# Patient Record
Sex: FEMALE | Race: OTHER | Employment: OTHER | URBAN - METROPOLITAN AREA
[De-identification: names, ages, dates, MRNs, and addresses within clinical notes are randomized per-mention and may not be internally consistent; named-entity substitution may affect disease eponyms.]

---

## 2018-11-29 PROBLEM — E78.5 DYSLIPIDEMIA: Status: ACTIVE | Noted: 2018-11-29

## 2018-11-29 PROBLEM — I10 ESSENTIAL HYPERTENSION: Status: ACTIVE | Noted: 2018-11-29

## 2018-11-29 PROBLEM — E03.9 ACQUIRED HYPOTHYROIDISM: Status: ACTIVE | Noted: 2018-11-29

## 2019-08-28 PROCEDURE — 87077 CULTURE AEROBIC IDENTIFY: CPT | Performed by: EMERGENCY MEDICINE

## 2019-08-28 PROCEDURE — 87086 URINE CULTURE/COLONY COUNT: CPT | Performed by: EMERGENCY MEDICINE

## 2019-08-28 PROCEDURE — 87186 SC STD MICRODIL/AGAR DIL: CPT | Performed by: EMERGENCY MEDICINE

## 2019-09-06 PROBLEM — N18.30 STAGE 3 CHRONIC KIDNEY DISEASE (HCC): Status: ACTIVE | Noted: 2019-09-06

## 2019-09-06 PROBLEM — D50.9 IRON DEFICIENCY ANEMIA, UNSPECIFIED IRON DEFICIENCY ANEMIA TYPE: Status: ACTIVE | Noted: 2019-09-06

## 2019-09-06 PROBLEM — R05.9 COUGH: Status: ACTIVE | Noted: 2019-09-06

## 2019-09-12 ENCOUNTER — HOSPITAL ENCOUNTER (OUTPATIENT)
Dept: GENERAL RADIOLOGY | Age: 84
Discharge: HOME | End: 2019-09-12
Attending: FAMILY MEDICINE
Payer: MEDICARE

## 2019-09-12 ENCOUNTER — LAB ENCOUNTER (OUTPATIENT)
Dept: LAB | Age: 84
End: 2019-09-12
Attending: FAMILY MEDICINE
Payer: MEDICARE

## 2019-09-12 DIAGNOSIS — E11.9 TYPE 2 DIABETES MELLITUS WITHOUT COMPLICATION, WITHOUT LONG-TERM CURRENT USE OF INSULIN (HCC): ICD-10-CM

## 2019-09-12 DIAGNOSIS — D64.9 ANEMIA, UNSPECIFIED TYPE: ICD-10-CM

## 2019-09-12 DIAGNOSIS — R05.9 COUGH: ICD-10-CM

## 2019-09-12 LAB
ANION GAP SERPL CALC-SCNC: 11 MMOL/L (ref 0–18)
BASOPHILS # BLD AUTO: 0.05 X10(3) UL (ref 0–0.2)
BASOPHILS NFR BLD AUTO: 0.6 %
BUN BLD-MCNC: 37 MG/DL (ref 7–18)
BUN/CREAT SERPL: 26.6 (ref 10–20)
CALCIUM BLD-MCNC: 9.7 MG/DL (ref 8.5–10.1)
CHLORIDE SERPL-SCNC: 105 MMOL/L (ref 98–112)
CO2 SERPL-SCNC: 24 MMOL/L (ref 21–32)
CREAT BLD-MCNC: 1.39 MG/DL (ref 0.55–1.02)
CREAT UR-SCNC: 220 MG/DL
DEPRECATED RDW RBC AUTO: 48.7 FL (ref 35.1–46.3)
EOSINOPHIL # BLD AUTO: 0.14 X10(3) UL (ref 0–0.7)
EOSINOPHIL NFR BLD AUTO: 1.8 %
ERYTHROCYTE [DISTWIDTH] IN BLOOD BY AUTOMATED COUNT: 13.9 % (ref 11–15)
GLUCOSE BLD-MCNC: 165 MG/DL (ref 70–99)
HCT VFR BLD AUTO: 34.1 % (ref 35–48)
HGB BLD-MCNC: 10.5 G/DL (ref 12–16)
IMM GRANULOCYTES # BLD AUTO: 0.04 X10(3) UL (ref 0–1)
IMM GRANULOCYTES NFR BLD: 0.5 %
LYMPHOCYTES # BLD AUTO: 2.19 X10(3) UL (ref 1–4)
LYMPHOCYTES NFR BLD AUTO: 27.5 %
MCH RBC QN AUTO: 29.9 PG (ref 26–34)
MCHC RBC AUTO-ENTMCNC: 30.8 G/DL (ref 31–37)
MCV RBC AUTO: 97.2 FL (ref 80–100)
MICROALBUMIN UR-MCNC: 1.12 MG/DL
MICROALBUMIN/CREAT 24H UR-RTO: 5.1 UG/MG (ref ?–30)
MONOCYTES # BLD AUTO: 0.58 X10(3) UL (ref 0.1–1)
MONOCYTES NFR BLD AUTO: 7.3 %
NEUTROPHILS # BLD AUTO: 4.96 X10 (3) UL (ref 1.5–7.7)
NEUTROPHILS # BLD AUTO: 4.96 X10(3) UL (ref 1.5–7.7)
NEUTROPHILS NFR BLD AUTO: 62.3 %
OSMOLALITY SERPL CALC.SUM OF ELEC: 302 MOSM/KG (ref 275–295)
PLATELET # BLD AUTO: 550 10(3)UL (ref 150–450)
POTASSIUM SERPL-SCNC: 4.3 MMOL/L (ref 3.5–5.1)
RBC # BLD AUTO: 3.51 X10(6)UL (ref 3.8–5.3)
SODIUM SERPL-SCNC: 140 MMOL/L (ref 136–145)
WBC # BLD AUTO: 8 X10(3) UL (ref 4–11)

## 2019-09-12 PROCEDURE — 80048 BASIC METABOLIC PNL TOTAL CA: CPT

## 2019-09-12 PROCEDURE — 36415 COLL VENOUS BLD VENIPUNCTURE: CPT

## 2019-09-12 PROCEDURE — 82570 ASSAY OF URINE CREATININE: CPT

## 2019-09-12 PROCEDURE — 71046 X-RAY EXAM CHEST 2 VIEWS: CPT | Performed by: FAMILY MEDICINE

## 2019-09-12 PROCEDURE — 85025 COMPLETE CBC W/AUTO DIFF WBC: CPT

## 2019-09-12 PROCEDURE — 82043 UR ALBUMIN QUANTITATIVE: CPT

## 2019-09-17 NOTE — PROGRESS NOTES
Patient informed per my chart. Unsure if she looks at my chart. Cbc ordered to be done in two months. Please follow up with this.  Results for orders placed or performed in visit on 09/12/19  -MICROALB/CREAT RATIO, RANDOM URINE  Collection Time: 09/12/1 9.7                           8.5 - 10.1 mg/dL                Calculated Osmolality                             302 (H)                       275 - 295 mOsm/kg               GFR, Non-                         33 (L) 2.19                          1.00 - 4.00 x10(3) uL           Monocyte Absolute                                 0.58                          0.10 - 1.00 x10(3) uL           Eosinophil Absolute                               0.14

## 2020-10-12 PROBLEM — I12.9 TYPE 2 DM WITH CKD STAGE 3 AND HYPERTENSION (HCC): Status: ACTIVE | Noted: 2020-10-12

## 2020-10-12 PROBLEM — N18.30 TYPE 2 DM WITH CKD STAGE 3 AND HYPERTENSION (HCC): Status: ACTIVE | Noted: 2020-10-12

## 2020-10-12 PROBLEM — E11.22 TYPE 2 DM WITH CKD STAGE 3 AND HYPERTENSION (HCC): Status: ACTIVE | Noted: 2020-10-12

## 2022-03-10 PROBLEM — E11.69 DYSLIPIDEMIA ASSOCIATED WITH TYPE 2 DIABETES MELLITUS (HCC): Status: ACTIVE | Noted: 2022-03-10

## 2022-03-10 PROBLEM — E11.22 TYPE 2 DIABETES MELLITUS WITH STAGE 4 CHRONIC KIDNEY DISEASE, WITH LONG-TERM CURRENT USE OF INSULIN (HCC): Status: ACTIVE | Noted: 2020-10-12

## 2022-03-10 PROBLEM — E78.5 DYSLIPIDEMIA ASSOCIATED WITH TYPE 2 DIABETES MELLITUS (HCC): Status: ACTIVE | Noted: 2022-03-10

## 2022-03-10 PROBLEM — N18.4 TYPE 2 DIABETES MELLITUS WITH STAGE 4 CHRONIC KIDNEY DISEASE, WITH LONG-TERM CURRENT USE OF INSULIN (HCC): Status: ACTIVE | Noted: 2020-10-12

## 2022-03-10 PROBLEM — Z79.4 TYPE 2 DIABETES MELLITUS WITH STAGE 4 CHRONIC KIDNEY DISEASE, WITH LONG-TERM CURRENT USE OF INSULIN (HCC): Status: ACTIVE | Noted: 2020-10-12

## 2025-03-21 ENCOUNTER — HOSPITAL ENCOUNTER (INPATIENT)
Facility: HOSPITAL | Age: OVER 89
LOS: 3 days | Discharge: HOME HEALTH | End: 2025-03-24
Attending: EMERGENCY MEDICINE | Admitting: HOSPITALIST
Payer: MEDICARE

## 2025-03-21 ENCOUNTER — HOSPITAL ENCOUNTER (INPATIENT)
Facility: HOSPITAL | Age: OVER 89
LOS: 3 days | Discharge: HOME | End: 2025-03-24
Attending: EMERGENCY MEDICINE | Admitting: HOSPITALIST
Payer: MEDICARE

## 2025-03-21 ENCOUNTER — APPOINTMENT (OUTPATIENT)
Dept: GENERAL RADIOLOGY | Facility: HOSPITAL | Age: OVER 89
End: 2025-03-21
Attending: EMERGENCY MEDICINE
Payer: MEDICARE

## 2025-03-21 DIAGNOSIS — J96.01 ACUTE HYPOXEMIC RESPIRATORY FAILURE (HCC): ICD-10-CM

## 2025-03-21 DIAGNOSIS — J45.901 ASTHMA WITH ACUTE EXACERBATION, UNSPECIFIED ASTHMA SEVERITY, UNSPECIFIED WHETHER PERSISTENT (HCC): ICD-10-CM

## 2025-03-21 DIAGNOSIS — J18.9 COMMUNITY ACQUIRED PNEUMONIA, UNSPECIFIED LATERALITY: Primary | ICD-10-CM

## 2025-03-21 LAB
ADENOVIRUS PCR:: NOT DETECTED
ALBUMIN SERPL-MCNC: 4.2 G/DL (ref 3.2–4.8)
ALBUMIN/GLOB SERPL: 1.1 {RATIO} (ref 1–2)
ALP LIVER SERPL-CCNC: 104 U/L
ALT SERPL-CCNC: <7 U/L
ANION GAP SERPL CALC-SCNC: 20 MMOL/L (ref 0–18)
AST SERPL-CCNC: 14 U/L (ref ?–34)
ATRIAL RATE: 101 BPM
B PARAPERT DNA SPEC QL NAA+PROBE: NOT DETECTED
B PERT DNA SPEC QL NAA+PROBE: NOT DETECTED
BASE EXCESS BLDV CALC-SCNC: 4.3 MMOL/L
BASOPHILS # BLD AUTO: 0.06 X10(3) UL (ref 0–0.2)
BASOPHILS NFR BLD AUTO: 0.4 %
BILIRUB SERPL-MCNC: 0.6 MG/DL (ref 0.2–0.9)
BUN BLD-MCNC: 34 MG/DL (ref 9–23)
C PNEUM DNA SPEC QL NAA+PROBE: NOT DETECTED
CA-I BLD-SCNC: 1.14 MMOL/L (ref 0.95–1.32)
CALCIUM BLD-MCNC: 9.9 MG/DL (ref 8.7–10.6)
CHLORIDE SERPL-SCNC: 96 MMOL/L (ref 98–112)
CO2 SERPL-SCNC: 20 MMOL/L (ref 21–32)
CORONAVIRUS 229E PCR:: NOT DETECTED
CORONAVIRUS HKU1 PCR:: NOT DETECTED
CORONAVIRUS NL63 PCR:: NOT DETECTED
CORONAVIRUS OC43 PCR:: NOT DETECTED
CREAT BLD-MCNC: 1.48 MG/DL
EGFRCR SERPLBLD CKD-EPI 2021: 32 ML/MIN/1.73M2 (ref 60–?)
EOSINOPHIL # BLD AUTO: 0.12 X10(3) UL (ref 0–0.7)
EOSINOPHIL NFR BLD AUTO: 0.8 %
ERYTHROCYTE [DISTWIDTH] IN BLOOD BY AUTOMATED COUNT: 13.6 %
EST. AVERAGE GLUCOSE BLD GHB EST-MCNC: 258 MG/DL (ref 68–126)
FLUAV + FLUBV RNA SPEC NAA+PROBE: NEGATIVE
FLUAV + FLUBV RNA SPEC NAA+PROBE: NEGATIVE
FLUAV RNA SPEC QL NAA+PROBE: NOT DETECTED
FLUBV RNA SPEC QL NAA+PROBE: NOT DETECTED
GLOBULIN PLAS-MCNC: 3.7 G/DL (ref 2–3.5)
GLUCOSE BLD-MCNC: 280 MG/DL (ref 70–99)
GLUCOSE BLD-MCNC: 335 MG/DL (ref 70–99)
GLUCOSE BLD-MCNC: 413 MG/DL (ref 70–99)
GLUCOSE BLD-MCNC: 503 MG/DL (ref 70–99)
GLUCOSE BLD-MCNC: 521 MG/DL (ref 70–99)
HBA1C MFR BLD: 10.6 % (ref ?–5.7)
HCO3 BLDV-SCNC: 28.2 MEQ/L (ref 22–26)
HCT VFR BLD AUTO: 34.8 %
HGB BLD-MCNC: 11.7 G/DL
IMM GRANULOCYTES # BLD AUTO: 0.2 X10(3) UL (ref 0–1)
IMM GRANULOCYTES NFR BLD: 1.4 %
L PNEUMO AG UR QL: NEGATIVE
LACTATE SERPL-SCNC: 1 MMOL/L (ref 0.5–2)
LYMPHOCYTES # BLD AUTO: 2.13 X10(3) UL (ref 1–4)
LYMPHOCYTES NFR BLD AUTO: 14.8 %
MCH RBC QN AUTO: 29.7 PG (ref 26–34)
MCHC RBC AUTO-ENTMCNC: 33.6 G/DL (ref 31–37)
MCV RBC AUTO: 88.3 FL
METAPNEUMOVIRUS PCR:: NOT DETECTED
MONOCYTES # BLD AUTO: 1.09 X10(3) UL (ref 0.1–1)
MONOCYTES NFR BLD AUTO: 7.6 %
MYCOPLASMA PNEUMONIA PCR:: NOT DETECTED
NEUTROPHILS # BLD AUTO: 10.8 X10 (3) UL (ref 1.5–7.7)
NEUTROPHILS # BLD AUTO: 10.8 X10(3) UL (ref 1.5–7.7)
NEUTROPHILS NFR BLD AUTO: 75 %
OSMOLALITY SERPL CALC.SUM OF ELEC: 300 MOSM/KG (ref 275–295)
OXYHGB MFR BLDV: 95.4 % (ref 72–78)
P AXIS: 79 DEGREES
P-R INTERVAL: 142 MS
PARAINFLUENZA 1 PCR:: NOT DETECTED
PARAINFLUENZA 2 PCR:: NOT DETECTED
PARAINFLUENZA 3 PCR:: NOT DETECTED
PARAINFLUENZA 4 PCR:: NOT DETECTED
PCO2 BLDV: 34 MM HG (ref 38–50)
PH BLDV: 7.51 [PH] (ref 7.33–7.43)
PLATELET # BLD AUTO: 452 10(3)UL (ref 150–450)
PO2 BLDV: 104 MM HG (ref 30–50)
POTASSIUM BLD-SCNC: 4.2 MMOL/L (ref 3.6–5.1)
POTASSIUM SERPL-SCNC: 4.1 MMOL/L (ref 3.5–5.1)
PROCALCITONIN SERPL-MCNC: 0.21 NG/ML (ref ?–0.05)
PROT SERPL-MCNC: 7.9 G/DL (ref 5.7–8.2)
Q-T INTERVAL: 382 MS
QRS DURATION: 120 MS
QTC CALCULATION (BEZET): 495 MS
R AXIS: -56 DEGREES
RBC # BLD AUTO: 3.94 X10(6)UL
RHINOVIRUS/ENTERO PCR:: NOT DETECTED
RSV RNA SPEC NAA+PROBE: NEGATIVE
RSV RNA SPEC QL NAA+PROBE: NOT DETECTED
SARS-COV-2 RNA NPH QL NAA+NON-PROBE: NOT DETECTED
SARS-COV-2 RNA RESP QL NAA+PROBE: NOT DETECTED
SODIUM BLD-SCNC: 132 MMOL/L (ref 135–145)
SODIUM SERPL-SCNC: 136 MMOL/L (ref 136–145)
T AXIS: 90 DEGREES
VENOUS LITERS PER MINUTE: 10 L/MIN
VENTRICULAR RATE: 101 BPM
WBC # BLD AUTO: 14.4 X10(3) UL (ref 4–11)

## 2025-03-21 PROCEDURE — 71045 X-RAY EXAM CHEST 1 VIEW: CPT | Performed by: EMERGENCY MEDICINE

## 2025-03-21 PROCEDURE — 99223 1ST HOSP IP/OBS HIGH 75: CPT | Performed by: INTERNAL MEDICINE

## 2025-03-21 RX ORDER — ONDANSETRON 2 MG/ML
4 INJECTION INTRAMUSCULAR; INTRAVENOUS EVERY 6 HOURS PRN
Status: DISCONTINUED | OUTPATIENT
Start: 2025-03-21 | End: 2025-03-24

## 2025-03-21 RX ORDER — METHYLPREDNISOLONE SODIUM SUCCINATE 125 MG/2ML
125 INJECTION INTRAMUSCULAR; INTRAVENOUS ONCE
Status: COMPLETED | OUTPATIENT
Start: 2025-03-21 | End: 2025-03-21

## 2025-03-21 RX ORDER — NICOTINE POLACRILEX 4 MG
30 LOZENGE BUCCAL
Status: DISCONTINUED | OUTPATIENT
Start: 2025-03-21 | End: 2025-03-24

## 2025-03-21 RX ORDER — IPRATROPIUM BROMIDE AND ALBUTEROL SULFATE 2.5; .5 MG/3ML; MG/3ML
3 SOLUTION RESPIRATORY (INHALATION)
Status: DISCONTINUED | OUTPATIENT
Start: 2025-03-21 | End: 2025-03-24

## 2025-03-21 RX ORDER — ALBUTEROL SULFATE 0.83 MG/ML
2.5 SOLUTION RESPIRATORY (INHALATION) EVERY 4 HOURS PRN
Status: DISCONTINUED | OUTPATIENT
Start: 2025-03-21 | End: 2025-03-22

## 2025-03-21 RX ORDER — INSULIN DEGLUDEC 100 U/ML
10 INJECTION, SOLUTION SUBCUTANEOUS NIGHTLY
Status: DISCONTINUED | OUTPATIENT
Start: 2025-03-21 | End: 2025-03-21

## 2025-03-21 RX ORDER — LOSARTAN POTASSIUM 25 MG/1
TABLET ORAL DAILY
COMMUNITY
End: 2025-03-21 | Stop reason: CLARIF

## 2025-03-21 RX ORDER — AZITHROMYCIN 250 MG/1
500 TABLET, FILM COATED ORAL DAILY
Status: COMPLETED | OUTPATIENT
Start: 2025-03-22 | End: 2025-03-23

## 2025-03-21 RX ORDER — INSULIN DEGLUDEC 100 U/ML
15 INJECTION, SOLUTION SUBCUTANEOUS NIGHTLY
Status: DISCONTINUED | OUTPATIENT
Start: 2025-03-21 | End: 2025-03-23

## 2025-03-21 RX ORDER — SODIUM CHLORIDE 9 MG/ML
INJECTION, SOLUTION INTRAVENOUS CONTINUOUS
Status: DISCONTINUED | OUTPATIENT
Start: 2025-03-21 | End: 2025-03-22

## 2025-03-21 RX ORDER — ENOXAPARIN SODIUM 100 MG/ML
30 INJECTION SUBCUTANEOUS NIGHTLY
Status: DISCONTINUED | OUTPATIENT
Start: 2025-03-21 | End: 2025-03-24

## 2025-03-21 RX ORDER — NICOTINE POLACRILEX 4 MG
15 LOZENGE BUCCAL
Status: DISCONTINUED | OUTPATIENT
Start: 2025-03-21 | End: 2025-03-24

## 2025-03-21 RX ORDER — ACETAMINOPHEN 500 MG
500 TABLET ORAL EVERY 4 HOURS PRN
Status: DISCONTINUED | OUTPATIENT
Start: 2025-03-21 | End: 2025-03-24

## 2025-03-21 RX ORDER — SODIUM CHLORIDE 9 MG/ML
INJECTION, SOLUTION INTRAVENOUS CONTINUOUS
Status: DISCONTINUED | OUTPATIENT
Start: 2025-03-21 | End: 2025-03-23

## 2025-03-21 RX ORDER — GLIPIZIDE 2.5 MG/1
2.5 TABLET, EXTENDED RELEASE ORAL 2 TIMES DAILY
COMMUNITY
End: 2025-03-24

## 2025-03-21 RX ORDER — MONTELUKAST SODIUM 10 MG/1
10 TABLET ORAL NIGHTLY
Status: DISCONTINUED | OUTPATIENT
Start: 2025-03-21 | End: 2025-03-24

## 2025-03-21 RX ORDER — LEVOTHYROXINE SODIUM 50 UG/1
50 TABLET ORAL
Status: DISCONTINUED | OUTPATIENT
Start: 2025-03-22 | End: 2025-03-24

## 2025-03-21 RX ORDER — ALBUTEROL SULFATE 5 MG/ML
10 SOLUTION RESPIRATORY (INHALATION) ONCE
Status: COMPLETED | OUTPATIENT
Start: 2025-03-21 | End: 2025-03-21

## 2025-03-21 RX ORDER — ECHINACEA PURPUREA EXTRACT 125 MG
1 TABLET ORAL
Status: DISCONTINUED | OUTPATIENT
Start: 2025-03-21 | End: 2025-03-24

## 2025-03-21 RX ORDER — ATORVASTATIN CALCIUM 10 MG/1
10 TABLET, FILM COATED ORAL NIGHTLY
Status: DISCONTINUED | OUTPATIENT
Start: 2025-03-21 | End: 2025-03-24

## 2025-03-21 RX ORDER — LOSARTAN POTASSIUM AND HYDROCHLOROTHIAZIDE 12.5; 5 MG/1; MG/1
1 TABLET ORAL DAILY
COMMUNITY

## 2025-03-21 RX ORDER — DEXTROSE MONOHYDRATE 25 G/50ML
50 INJECTION, SOLUTION INTRAVENOUS
Status: DISCONTINUED | OUTPATIENT
Start: 2025-03-21 | End: 2025-03-24

## 2025-03-21 RX ORDER — BENZONATATE 200 MG/1
200 CAPSULE ORAL 3 TIMES DAILY PRN
Status: DISCONTINUED | OUTPATIENT
Start: 2025-03-21 | End: 2025-03-24

## 2025-03-21 NOTE — PROGRESS NOTES
NURSING ADMISSION NOTE      Patient admitted via Cart  Oriented to room.  Safety precautions initiated.  Bed in low position.  Call light in reach.     Patient received a/o x4. 88% on room air. Reports SOB. Placed on 2L NC. Denies pain. VSS. Son at bedside. Safety precautions in place. No further needs at this time.

## 2025-03-21 NOTE — H&P
[facility]     China Hawk Patient Status:  Inpatient    1928 MRN [id number]   Location [facility] 5NW-A Attending Patricia Guzman MD   Hosp Day # 0 PCP Gerhard Rashid DO     Chief Complaint: cough     Subjective:    History of Present Illness:     China Hawk is a 89 year old female with asthma, diabetes, hyper lipidemia, hypothyroidism to the emergency room with cough, breath.  This has been ongoing over the last 1 week.  No fever or chills, nausea or vomiting, diarrhea reported.  Lower extremity edema or orthopnea or PND reported.  Emergency room patient was found to be 87% on room air.    History/Other:    Past Medical History:  Past Medical History:    Asthma (HCC)    Disorder of thyroid    Extrinsic asthma, unspecified    Hearing impaired person, bilateral    High cholesterol    Hypothyroid    Other and unspecified hyperlipidemia    Type II or unspecified type diabetes mellitus without mention of complication, not stated as uncontrolled    tingling in feet    Unspecified essential hypertension    Visual impairment     Past Surgical History:   Past Surgical History:   Procedure Laterality Date    Cholecystectomy        Family History:   Family History   Problem Relation Age of Onset    Other (Other) Father          pneumonia    Diabetes Brother      Social History:    reports that she has quit smoking. Her smoking use included cigarettes. She has never used smokeless tobacco. She reports that she does not drink alcohol and does not use drugs.     Allergies: Allergies[1]    Medications:  Medications Ordered Prior to Encounter[2]    Review of Systems:   A comprehensive review of systems was completed.    Pertinent positives and negatives noted in the HPI.    Objective:   Physical Exam:    /67 (BP Location: Left arm)   Pulse 106   Temp 98 °F (36.7 °C) (Axillary)   Resp 18   Ht 5' 2\" (1.575 m)   Wt 120 lb (54.4 kg)   SpO2 92%   BMI 21.95 kg/m²    General: No acute distress, Alert  Respiratory: No rhonchi, no wheezes  Cardiovascular: S1, S2. Regular rate and rhythm  Abdomen: Soft, Non-tender, non-distended, positive bowel sounds  Neuro: No new focal deficits  Extremities: No edema      Results:    Labs:      Labs Last 24 Hours:    Recent Labs   Lab 03/21/25  1058   RBC 3.94   HGB 11.7*   HCT 34.8*   MCV 88.3   MCH 29.7   MCHC 33.6   RDW 13.6   NEPRELIM 10.80*   WBC 14.4*   .0*       Recent Labs   Lab 03/21/25  1058   *   BUN 34*   CREATSERUM 1.48*   EGFRCR 32*   CA 9.9   ALB 4.2      K 4.1   CL 96*   CO2 20.0*   ALKPHO 104   AST 14   ALT <7*   BILT 0.6   TP 7.9       Estimated Glomerular Filtration Rate: 32 mL/min/1.73m2 (A) (result from lab).    No results found for: \"PT\", \"INR\"    No results for input(s): \"TROP\", \"TROPHS\", \"CK\" in the last 168 hours.    No results for input(s): \"TROP\", \"PBNP\" in the last 168 hours.    No results for input(s): \"PCT\" in the last 168 hours.    Imaging: Imaging data reviewed in Epic.    Assessment & Plan:      # Acute hypoxic respiratory failure due to possible underlying pneumonia  # Possible underlying bacterial or viral pneumonia   # Early sepsis (this is, tachycardia and tachypnea, hypoxia)   - blood cx   - sputum cx  - RVP  - PCT  - abx  - neb   - hold off on further steroid as per exam not concerning for asthma exacerbation     # DM 2 with hyperglycemia   - ISS  - Tresiba  - HgA1c    # Asthma   - neb     # mild thrombocytosis likely reactive  # mild metabolic acidosis, monitor   # CKD 4 due to underlying DM  # hypothyroidism       Plan of care discussed with patient and ER.     Romelia Fields MD    Supplementary Documentation:     The 21st Century Cures Act makes medical notes like these available to patients in the interest of transparency. Please be advised this is a medical document. Medical documents are intended to carry relevant information, facts as evident, and the clinical opinion of the  practitioner. The medical note is intended as peer to peer communication and may appear blunt or direct. It is written in medical language and may contain abbreviations or verbiage that are unfamiliar.               **Certification      PHYSICIAN Certification of Need for Inpatient Hospitalization - Initial Certification    Patient will require inpatient services that will reasonably be expected to span two midnight's based on the clinical documentation in H+P.   Based on patients current state of illness, I anticipate that, after discharge, patient will require TBD.                        [1] No Known Allergies  [2]   No current facility-administered medications on file prior to encounter.     Current Outpatient Medications on File Prior to Encounter   Medication Sig Dispense Refill    glipiZIDE ER 2.5 MG Oral Tablet 24 Hr Take 1 tablet (2.5 mg total) by mouth 2 (two) times daily.      losartan-hydroCHLOROthiazide 50-12.5 MG Oral Tab Take 1 tablet by mouth daily.      insulin glargine 100 UNIT/ML Subcutaneous Solution Pen-injector Inject 10 Units into the skin nightly. (Patient taking differently: Inject 10 Units into the skin every morning.) 3 each 3    albuterol (PROAIR HFA) 108 (90 Base) MCG/ACT Inhalation Aero Soln Inhale 1 puff into the lungs every 6 (six) hours as needed for Wheezing. 3 each 3    ALBUTEROL (2.5 MG/3ML) 0.083% Inhalation Nebu Soln INHALE 1 VIAL (3ML) ONE EVERY 6 HOURS AS NEEDED FOR WHEEZING 75 mL 5    montelukast 10 MG Oral Tab Take 1 tablet (10 mg total) by mouth every evening. 90 tablet 3    simvastatin 20 MG Oral Tab Take 1 tablet (20 mg total) by mouth every evening. 90 tablet 3    levothyroxine 50 MCG Oral Tab Take 1 tablet (50 mcg total) by mouth daily. 90 tablet 3

## 2025-03-21 NOTE — ED INITIAL ASSESSMENT (HPI)
Patient to ED, here with family.  Reports for the past month the patient has been having difficulty breathing and a cough.  Negative for coivd 1 week ago.    Increased weakness over the past month as well.

## 2025-03-21 NOTE — ED PROVIDER NOTES
Patient Seen in: [facility] Emergency Department      History     Chief Complaint   Patient presents with    Weakness    Difficulty Breathing     Stated Complaint: weakness, adelita for 1 month    Subjective:   HPI      89-year-old female with history of asthma, diabetes mellitus, hypertension, high cholesterol presents emergency room for evaluation of cough and shortness of breath.  Symptoms have been present for over a week.  Cough is minimally productive.  Patient denies chest pain does feel short of breath.  Denies abdominal pain.  Denies nausea vomiting or diarrhea.  Denies lower extremity swelling or calf pain denies PND orthopnea.  Patient was noted to be hypoxic upon arrival at 87% on room air.    Objective:     Past Medical History:    Extrinsic asthma, unspecified    Hypothyroid    Other and unspecified hyperlipidemia    Type II or unspecified type diabetes mellitus without mention of complication, not stated as uncontrolled    tingling in feet    Unspecified essential hypertension              Past Surgical History:   Procedure Laterality Date    Cholecystectomy                  Social History     Socioeconomic History    Marital status:    Tobacco Use    Smoking status: Former     Types: Cigarettes    Smokeless tobacco: Never    Tobacco comments:     quit about 40 years ago   Substance and Sexual Activity    Alcohol use: No     Alcohol/week: 0.0 standard drinks of alcohol    Drug use: No     Social Drivers of Health      Received from [facility]                  Physical Exam     ED Triage Vitals [03/21/25 1039]   /76   Pulse 103   Resp 24   Temp 97.7 °F (36.5 °C)   Temp src Temporal   SpO2 (!) 87 %   O2 Device None (Room air)       Current Vitals:   Vital Signs  BP: (!) 121/105  Pulse: 94  Resp: 22  Temp: 97.7 °F (36.5 °C)  Temp src: Temporal  MAP (mmHg): (!) 112    Oxygen Therapy  SpO2: 100 %  O2 Device: Other (Comment) (hour long neb treatment)        Physical  Exam  GENERAL: Patient is awake, alert  HEENT: no scleral icterus.  Mucous membranes are moist  HEART: Regular rate and rhythm, no murmurs.  LUNGS: Decreased breath sounds with scattered rhonchi diffuse wheezing  ABDOMEN: Soft, nondistended,non tender  EXTREMITIES: No peripheral edema, no calf tenderness      ED Course     Labs Reviewed   COMP METABOLIC PANEL (14) - Abnormal; Notable for the following components:       Result Value    Glucose 280 (*)     Chloride 96 (*)     CO2 20.0 (*)     Anion Gap 20 (*)     BUN 34 (*)     Creatinine 1.48 (*)     Calculated Osmolality 300 (*)     eGFR-Cr 32 (*)     ALT <7 (*)     Globulin  3.7 (*)     All other components within normal limits   CBC WITH DIFFERENTIAL WITH PLATELET - Abnormal; Notable for the following components:    WBC 14.4 (*)     HGB 11.7 (*)     HCT 34.8 (*)     .0 (*)     Neutrophil Absolute Prelim 10.80 (*)     Neutrophil Absolute 10.80 (*)     Monocyte Absolute 1.09 (*)     All other components within normal limits   VBG PANEL WITH ELECTROLYTES - Abnormal; Notable for the following components:    Venous pH 7.51 (*)     Venous pCO2 34 (*)     Venous pO2 104 (*)     Venous HCO3 28.2 (*)     Venous O2Hb 95.4 (*)     Sodium Blood Gas 132 (*)     All other components within normal limits   SARS-COV-2/FLU A AND B/RSV BY PCR (Fixstars[employer]ERT) - Normal    Narrative:     This test is intended for the qualitative detection and differentiation of SARS-CoV-2, influenza A, influenza B, and respiratory syncytial virus (RSV) viral RNA in nasopharyngeal or nares swabs from individuals suspected of respiratory viral infection consistent with COVID-19 by their healthcare provider. Signs and symptoms of respiratory viral infection due to SARS-CoV-2, influenza, and RSV can be similar.    Test performed using the Xpert Xpress SARS-CoV-2/FLU/RSV (real time RT-PCR)  assay on the [employer] instrument, [employer], [place] [address].   This test is being used under the Food and Drug  Administration's Emergency Use Authorization.    The authorized Fact Sheet for Healthcare Providers for this assay is available upon request from the laboratory.   LACTIC ACID, PLASMA   VENOUS BLOOD GAS   RAINBOW DRAW LAVENDER   RAINBOW DRAW LIGHT GREEN   RAINBOW DRAW BLUE   BLOOD CULTURE   BLOOD CULTURE   BLOOD CULTURE     EKG    Rate, intervals and axes as noted on EKG Report.  Rate: 101  Rhythm: Sinus Rhythm  Reading: Sinus tachycardia.  No ST elevation.                A total of 40 minutes of critical care time (exclusive of billable procedures) was administered to manage the patient's respiratory instability due to her acute hypoxemic respiratory failure/asthma exacerbation/pneumonia.  This involved direct patient intervention, complex decision making, and/or extensive discussions with the patient, family, and clinical staff.         MDM        Differential diagnosis before testing includes but not limited to pneumonia, bronchitis, pneumothorax, pulm edema, COVID, RSV, influenza, which is a medical condition that poses a threat to life/function    Past Medical History/comorbidities-as noted in HPI    Radiographic images  I personally reviewed the radiographs and my individual interpretation shows right lower lobe infiltrate  I also reviewed the official reports that showed findings suspicious for right lower lobe pneumonia with suspected mild subpulmonic effusion    Discussion of management (consult/physicians, social work, pharmacy,ect) Dr. Fields, hospitalist physician      Course of Events during Emergency Room Visit include IV established placed on cardiac monitor and pulse ox.  Hour-long nebulizer and IV steroids given.  Chest x-ray concerning for pneumonia, blood cultures performed.  Chemistry sodium 136 potassium 4.1 bicarb 20 BUN 34 creatinine 1.4 glucose 280.  White count 14.4 hemoglobin 9.7 platelet 452.  COVID/RSV/influenza was negative.  On reevaluation there is marked improvement air movement  bilaterally, patient still has mild expiratory wheeze.  Discussed with hospitalist physician.  Will admit for further evaluation and treatment.  Discussed all results and plan the patient and family at bedside they agree with plan    Shared decision making was utilized           Disposition:    Admission  I have discussed with the patient the results of test, differential diagnosis, and treatment plan. They expressed clear understanding of these instructions and agrees to the plan provided.       Note to patient: The 21st Century Cures Act makes medical notes like these available to patients in the interest of transparency. However, this is a medical document intended as peer to peer communication. It is written in medical language and may contain abbreviations or verbiage that are unfamiliar. It may appear blunt or direct. Medical documents are intended to carry relevant information, facts as evident, and the clinical opinion of the practitioner.             Admission disposition: 3/21/2025 11:45 AM           Medical Decision Making      Disposition and Plan     Clinical Impression:  1. Community acquired pneumonia, unspecified laterality    2. Acute hypoxemic respiratory failure (HCC)    3. Asthma with acute exacerbation, unspecified asthma severity, unspecified whether persistent (HCC)         Disposition:  Admit  3/21/2025 11:45 am    Follow-up:  No follow-up provider specified.        Medications Prescribed:  Current Discharge Medication List              Supplementary Documentation:         Hospital Problems       Present on Admission  Date Reviewed: 3/10/2022            ICD-10-CM Noted POA    * (Principal) Community acquired pneumonia, unspecified laterality J18.9 3/21/2025 Unknown

## 2025-03-21 NOTE — ED QUICK NOTES
Orders for admission, patient is aware of plan and ready to go upstairs. Any questions, please call ED RN jignesh at extension [phone].     Patient Covid vaccination status: Partially vaccinated     COVID Test Ordered in ED: SARS-CoV-2/Flu A and B/RSV by PCR (GeneXpert)    COVID Suspicion at Admission: N/A    Running Infusions:    sodium chloride      Rocephin     Mental Status/LOC at time of transport: A&O x 3    Other pertinent information:   CIWA score: N/A   NIH score:  N/A

## 2025-03-22 PROBLEM — Z79.4 TYPE 2 DIABETES MELLITUS WITH HYPERGLYCEMIA, WITH LONG-TERM CURRENT USE OF INSULIN (HCC): Status: ACTIVE | Noted: 2025-03-22

## 2025-03-22 PROBLEM — E11.65 TYPE 2 DIABETES MELLITUS WITH HYPERGLYCEMIA, WITH LONG-TERM CURRENT USE OF INSULIN (HCC): Status: ACTIVE | Noted: 2025-03-22

## 2025-03-22 LAB
ANION GAP SERPL CALC-SCNC: 12 MMOL/L (ref 0–18)
BUN BLD-MCNC: 32 MG/DL (ref 9–23)
CALCIUM BLD-MCNC: 9.3 MG/DL (ref 8.7–10.6)
CHLORIDE SERPL-SCNC: 105 MMOL/L (ref 98–112)
CO2 SERPL-SCNC: 22 MMOL/L (ref 21–32)
CREAT BLD-MCNC: 1.34 MG/DL
EGFRCR SERPLBLD CKD-EPI 2021: 36 ML/MIN/1.73M2 (ref 60–?)
ERYTHROCYTE [DISTWIDTH] IN BLOOD BY AUTOMATED COUNT: 13.9 %
GLUCOSE BLD-MCNC: 115 MG/DL (ref 70–99)
GLUCOSE BLD-MCNC: 217 MG/DL (ref 70–99)
GLUCOSE BLD-MCNC: 270 MG/DL (ref 70–99)
GLUCOSE BLD-MCNC: 275 MG/DL (ref 70–99)
GLUCOSE BLD-MCNC: 344 MG/DL (ref 70–99)
HCT VFR BLD AUTO: 30.5 %
HGB BLD-MCNC: 9.9 G/DL
MCH RBC QN AUTO: 29 PG (ref 26–34)
MCHC RBC AUTO-ENTMCNC: 32.5 G/DL (ref 31–37)
MCV RBC AUTO: 89.4 FL
OSMOLALITY SERPL CALC.SUM OF ELEC: 301 MOSM/KG (ref 275–295)
PLATELET # BLD AUTO: 404 10(3)UL (ref 150–450)
POTASSIUM SERPL-SCNC: 3.7 MMOL/L (ref 3.5–5.1)
RBC # BLD AUTO: 3.41 X10(6)UL
SODIUM SERPL-SCNC: 139 MMOL/L (ref 136–145)
WBC # BLD AUTO: 14.8 X10(3) UL (ref 4–11)

## 2025-03-22 PROCEDURE — 99232 SBSQ HOSP IP/OBS MODERATE 35: CPT | Performed by: INTERNAL MEDICINE

## 2025-03-22 RX ORDER — FUROSEMIDE 10 MG/ML
20 INJECTION INTRAMUSCULAR; INTRAVENOUS ONCE
Status: COMPLETED | OUTPATIENT
Start: 2025-03-22 | End: 2025-03-22

## 2025-03-22 RX ORDER — ALBUTEROL SULFATE 0.83 MG/ML
2.5 SOLUTION RESPIRATORY (INHALATION)
Status: DISCONTINUED | OUTPATIENT
Start: 2025-03-22 | End: 2025-03-22

## 2025-03-22 RX ORDER — SODIUM CHLORIDE FOR INHALATION 3 %
3 VIAL, NEBULIZER (ML) INHALATION
Status: COMPLETED | OUTPATIENT
Start: 2025-03-22 | End: 2025-03-22

## 2025-03-22 NOTE — PROGRESS NOTES
Problem: PNA    Data: Pt is A&Ox3. Forgetful at times.  on 1L. RAB.  On tele. NSR/S. Gets lovenox. Tolerating diet. Voids up SBA and R.W.. BG elevated tonight MD aware see orders given for insulin.     Intervention: IVF, Additional Insulin given for High BG. IV abx's, PO abx's     Edu:  Bed alarm on and call light within reach. VSS so far.

## 2025-03-22 NOTE — PROGRESS NOTES
[facility]   [facility]     Hospitalist Progress Note     China Hawk Patient Status:  Inpatient    1928 MRN [id number]   Location [facility] 5NW-A Attending Romelia Fields MD   Hosp Day # 1 PCP Gerhard Rashid DO     Chief Complaint: cough     Subjective:     Patient coughing a lot. On 2 L NC. Son at bedside.     Objective:    Review of Systems:   A comprehensive review of systems was completed; pertinent positive and negatives stated in subjective.    Vital signs:  Temp:  [97.9 °F (36.6 °C)-98.9 °F (37.2 °C)] 97.9 °F (36.6 °C)  Pulse:  [] 103  Resp:  [20-25] 25  BP: (115-151)/(50-92) 151/66  SpO2:  [91 %-99 %] 91 %    Physical Exam:    General: No acute distress  Respiratory: coarse bibasilar BS  Cardiovascular: S1, S2, regular rate and rhythm  Abdomen: Soft, Non-tender, non-distended, positive bowel sounds  Neuro: No new focal deficits.   Extremities: No edema      Diagnostic Data:    Labs:  Recent Labs   Lab 25  1058 25  0707   WBC 14.4* 14.8*   HGB 11.7* 9.9*   MCV 88.3 89.4   .0* 404.0       Recent Labs   Lab 25  1058 25  0707   * 217*   BUN 34* 32*   CREATSERUM 1.48* 1.34*   CA 9.9 9.3   ALB 4.2  --     139   K 4.1 3.7   CL 96* 105   CO2 20.0* 22.0   ALKPHO 104  --    AST 14  --    ALT <7*  --    BILT 0.6  --    TP 7.9  --        Estimated Glomerular Filtration Rate: 36 mL/min/1.73m2 (A) (result from lab).    No results for input(s): \"TROP\", \"TROPHS\", \"CK\" in the last 168 hours.    No results for input(s): \"PTP\", \"INR\" in the last 168 hours.               Microbiology    No results found for this visit on 25.      Imaging: Reviewed in Epic.    Medications:    ipratropium-albuterol  3 mL Nebulization 6 times per day    levothyroxine  50 mcg Oral Daily @ 0700    atorvastatin  10 mg Oral Nightly    montelukast  10 mg Oral Nightly    enoxaparin  30 mg Subcutaneous Nightly    insulin aspart  1-68 Units Subcutaneous TID CC     insulin aspart  2-10 Units Subcutaneous TID AC and HS    azithromycin  500 mg Oral Daily    ampicillin-sulbactam  3 g Intravenous q12h    insulin degludec  15 Units Subcutaneous Nightly       Assessment & Plan:      # Acute hypoxic respiratory failure due to possible underlying gram negative pneumonia  # Presumed gram negative PNA   - RVP neg  - PCT mildly elevated   - con abx   - neb   - lasix IV x1  - OOB, IS    # Early sepsis (this is, tachycardia and tachypnea, hypoxia)   - blood cx   - sputum cx  - RVP neg   - abx  - neb   - hold off on further steroid as per exam not concerning for asthma exacerbation      # DM 2 with hyperglycemia   - ISS  - Tresiba inc to 15U nightly   - HgA1c >10     # Asthma   - neb      # mild thrombocytosis likely reactive, monitor   # mild metabolic acidosis, monitor   # CKD 4 due to underlying DM  # hypothyroidism     D/w POA and GOC discussed. He could not confidently tell me what patient's code status would be. He mentioned that he does not want her to be intubated but was not sure.     Romelia Fields MD    Supplementary Documentation:     Quality:  DVT Mechanical Prophylaxis:   SCDs,    DVT Pharmacologic Prophylaxis   Medication    enoxaparin (Lovenox) 30 MG/0.3ML SUBQ injection 30 mg                Code Status: Not on file  Santos: No urinary catheter in place  Santos Duration (in days):   Central line:    BRENT:     Discharge is dependent on: course  At this point Ms. Hawk is expected to be discharge to: TBD    The 21st Century Cures Act makes medical notes like these available to patients in the interest of transparency. Please be advised this is a medical document. Medical documents are intended to carry relevant information, facts as evident, and the clinical opinion of the practitioner. The medical note is intended as peer to peer communication and may appear blunt or direct. It is written in medical language and may contain abbreviations or verbiage that are unfamiliar.

## 2025-03-23 LAB
GLUCOSE BLD-MCNC: 135 MG/DL (ref 70–99)
GLUCOSE BLD-MCNC: 170 MG/DL (ref 70–99)
GLUCOSE BLD-MCNC: 216 MG/DL (ref 70–99)
GLUCOSE BLD-MCNC: 267 MG/DL (ref 70–99)
GLUCOSE BLD-MCNC: 289 MG/DL (ref 70–99)
GLUCOSE BLD-MCNC: 53 MG/DL (ref 70–99)
GLUCOSE BLD-MCNC: 66 MG/DL (ref 70–99)

## 2025-03-23 PROCEDURE — 99232 SBSQ HOSP IP/OBS MODERATE 35: CPT | Performed by: INTERNAL MEDICINE

## 2025-03-23 RX ORDER — INSULIN DEGLUDEC 100 U/ML
10 INJECTION, SOLUTION SUBCUTANEOUS NIGHTLY
Status: DISCONTINUED | OUTPATIENT
Start: 2025-03-23 | End: 2025-03-24

## 2025-03-23 NOTE — PROGRESS NOTES
Blood sugar this AM 53- PO glucose juices given due to loss of IV access. BS recheck 66- IV dextrose given after IV access established. Recheck 216. Pt alert and follows commands. Asymptomatic no complaints at this time.

## 2025-03-23 NOTE — PLAN OF CARE
Alert x3-4. Confused/forgetful at times. Primarily German speaking- ipad  utilized to discuss POC w/ pt. Yells out at night. Room air sats WNL, c/o SOB at times- scheduled nebs given with relief met. Voids. Up SBA w/ walker. Generalized weakness noted. Denies any c/o pain. No n/v/d. No questions or concerns at this time. Call light within reach.     Problem: Diabetes/Glucose Control  Goal: Glucose maintained within prescribed range  Description: INTERVENTIONS:- Monitor Blood Glucose as ordered- Assess for signs and symptoms of hyperglycemia and hypoglycemia- Administer ordered medications to maintain glucose within target range- Assess barriers to adequate nutritional intake and initiate nutrition consult as needed- Instruct patient on self management of diabetes  Outcome: Progressing     Problem: Patient/Family Goals  Goal: Patient/Family Long Term Goal  Description: Patient's Long Term Goal: discharge with appropriate resources    Interventions:  - comply with POC  - See additional Care Plan goals for specific interventions  Outcome: Progressing  Goal: Patient/Family Short Term Goal  Description: Patient's Short Term Goal:   3/22 noc: breathe easier    Interventions:   - scheduled nebs  -supplemental O2 prn  - See additional Care Plan goals for specific interventions  Outcome: Progressing

## 2025-03-23 NOTE — PROGRESS NOTES
[facility]   part of [facility]     Hospitalist Progress Note     Chian Hawk Patient Status:  Inpatient    1928 MRN [id number]   Location [facility] 5NW-A Attending Romelia Fields MD   Hosp Day # 2 PCP Gerhard Rashid DO     Chief Complaint: cough     Subjective:     Patient with less cough, confused today, pleasant and cooperative but seems to be hallucinating and per son she does this at home too. On RA now.     Objective:    Review of Systems:   A comprehensive review of systems was completed; pertinent positive and negatives stated in subjective.    Vital signs:  Temp:  [97.7 °F (36.5 °C)-98.1 °F (36.7 °C)] 98.1 °F (36.7 °C)  Pulse:  [] 109  Resp:  [16-25] 24  BP: (115-159)/(58-92) 150/59  SpO2:  [91 %-97 %] 96 %    Physical Exam:    General: No acute distress  Respiratory: coarse bibasilar BS  Cardiovascular: S1, S2, regular rate and rhythm  Abdomen: Soft, Non-tender, non-distended, positive bowel sounds  Neuro: No new focal deficits.   Extremities: No edema      Diagnostic Data:    Labs:  Recent Labs   Lab 25  1058 25  0707   WBC 14.4* 14.8*   HGB 11.7* 9.9*   MCV 88.3 89.4   .0* 404.0       Recent Labs   Lab 25  1058 25  0707   * 217*   BUN 34* 32*   CREATSERUM 1.48* 1.34*   CA 9.9 9.3   ALB 4.2  --     139   K 4.1 3.7   CL 96* 105   CO2 20.0* 22.0   ALKPHO 104  --    AST 14  --    ALT <7*  --    BILT 0.6  --    TP 7.9  --        Estimated Glomerular Filtration Rate: 36 mL/min/1.73m2 (A) (result from lab).    No results for input(s): \"TROP\", \"TROPHS\", \"CK\" in the last 168 hours.    No results for input(s): \"PTP\", \"INR\" in the last 168 hours.               Microbiology    Hospital Encounter on 25   1. Blood Culture     Status: None (Preliminary result)    Collection Time: 25 12:04 PM    Specimen: Blood,peripheral   Result Value Ref Range    Blood Culture Result No Growth 1 Day N/A         Imaging: Reviewed in  Epic.    Medications:    insulin degludec  10 Units Subcutaneous Nightly    ipratropium-albuterol  3 mL Nebulization 6 times per day    levothyroxine  50 mcg Oral Daily @ 0700    atorvastatin  10 mg Oral Nightly    montelukast  10 mg Oral Nightly    enoxaparin  30 mg Subcutaneous Nightly    insulin aspart  1-68 Units Subcutaneous TID CC    insulin aspart  2-10 Units Subcutaneous TID AC and HS    azithromycin  500 mg Oral Daily    ampicillin-sulbactam  3 g Intravenous q12h       Assessment & Plan:      # Acute hypoxic respiratory failure due to possible underlying gram negative pneumonia  # Presumed gram negative PNA   - RVP neg  - PCT mildly elevated   - con abx   - neb   - lasix IV PRN  - OOB, IS  - home O2 eval    # Early sepsis (this is, tachycardia and tachypnea, hypoxia) , resolving   - blood cx NGTD  - sputum cx  - RVP neg   - abx  - neb   - hold off on further steroid as per exam not concerning for asthma exacerbation      # DM 2 with hyperglycemia /hypoglycemia this am   - ISS  - Tresiba decrease to 10 Unightly   - HgA1c >10     # Asthma   - neb      # mild thrombocytosis likely reactive, monitor   # mild metabolic acidosis, monitor   # CKD 4 due to underlying DM  # hypothyroidism   # dementia with some delirium   - monitor     D/w POA ; DC planning hopefully tomorrow if all goes well.     Romelia Fields MD    Supplementary Documentation:     Quality:  DVT Mechanical Prophylaxis:   SCDs,    DVT Pharmacologic Prophylaxis   Medication    enoxaparin (Lovenox) 30 MG/0.3ML SUBQ injection 30 mg                Code Status: Not on file  Santos: No urinary catheter in place  Santos Duration (in days):   Central line:    BRENT:     Discharge is dependent on: course  At this point Ms. Hawk is expected to be discharge to: TBD    The 21st Century Cures Act makes medical notes like these available to patients in the interest of transparency. Please be advised this is a medical document. Medical documents are intended to  carry relevant information, facts as evident, and the clinical opinion of the practitioner. The medical note is intended as peer to peer communication and may appear blunt or direct. It is written in medical language and may contain abbreviations or verbiage that are unfamiliar.

## 2025-03-23 NOTE — PLAN OF CARE
Patient is a/o x3. Sundowns. Jackson. Room air. NSR/ST on tele. Lovenox. Denies pain. PO zithro and IV unasyn. Up SBA w walker. Safety precautions in place. No further needs at this time.   Problem: Diabetes/Glucose Control  Goal: Glucose maintained within prescribed range  Description: INTERVENTIONS:- Monitor Blood Glucose as ordered- Assess for signs and symptoms of hyperglycemia and hypoglycemia- Administer ordered medications to maintain glucose within target range- Assess barriers to adequate nutritional intake and initiate nutrition consult as needed- Instruct patient on self management of diabetes  Outcome: Progressing

## 2025-03-24 VITALS
HEART RATE: 110 BPM | SYSTOLIC BLOOD PRESSURE: 116 MMHG | DIASTOLIC BLOOD PRESSURE: 58 MMHG | OXYGEN SATURATION: 90 % | HEIGHT: 62 IN | RESPIRATION RATE: 19 BRPM | BODY MASS INDEX: 22.08 KG/M2 | WEIGHT: 120 LBS | TEMPERATURE: 98 F

## 2025-03-24 LAB
ANION GAP SERPL CALC-SCNC: 9 MMOL/L (ref 0–18)
BUN BLD-MCNC: 28 MG/DL (ref 9–23)
CALCIUM BLD-MCNC: 9.1 MG/DL (ref 8.7–10.6)
CHLORIDE SERPL-SCNC: 104 MMOL/L (ref 98–112)
CO2 SERPL-SCNC: 27 MMOL/L (ref 21–32)
CREAT BLD-MCNC: 1.42 MG/DL
EGFRCR SERPLBLD CKD-EPI 2021: 34 ML/MIN/1.73M2 (ref 60–?)
ERYTHROCYTE [DISTWIDTH] IN BLOOD BY AUTOMATED COUNT: 14.5 %
GLUCOSE BLD-MCNC: 192 MG/DL (ref 70–99)
GLUCOSE BLD-MCNC: 58 MG/DL (ref 70–99)
GLUCOSE BLD-MCNC: 62 MG/DL (ref 70–99)
GLUCOSE BLD-MCNC: 73 MG/DL (ref 70–99)
HCT VFR BLD AUTO: 28.1 %
HGB BLD-MCNC: 9.4 G/DL
MCH RBC QN AUTO: 29.8 PG (ref 26–34)
MCHC RBC AUTO-ENTMCNC: 33.5 G/DL (ref 31–37)
MCV RBC AUTO: 89.2 FL
OSMOLALITY SERPL CALC.SUM OF ELEC: 293 MOSM/KG (ref 275–295)
PLATELET # BLD AUTO: 420 10(3)UL (ref 150–450)
POTASSIUM SERPL-SCNC: 3.7 MMOL/L (ref 3.5–5.1)
RBC # BLD AUTO: 3.15 X10(6)UL
SODIUM SERPL-SCNC: 140 MMOL/L (ref 136–145)
WBC # BLD AUTO: 13.4 X10(3) UL (ref 4–11)

## 2025-03-24 PROCEDURE — 99239 HOSP IP/OBS DSCHRG MGMT >30: CPT | Performed by: INTERNAL MEDICINE

## 2025-03-24 RX ORDER — INSULIN ASPART 100 [IU]/ML
INJECTION, SOLUTION INTRAVENOUS; SUBCUTANEOUS
Qty: 5 EACH | Refills: 3 | Status: SHIPPED | OUTPATIENT
Start: 2025-03-24

## 2025-03-24 RX ORDER — LANCETS 33 GAUGE
EACH MISCELLANEOUS
Qty: 100 EACH | Refills: 6 | Status: SHIPPED | OUTPATIENT
Start: 2025-03-24

## 2025-03-24 RX ORDER — BLOOD SUGAR DIAGNOSTIC
STRIP MISCELLANEOUS
Qty: 100 STRIP | Refills: 6 | Status: SHIPPED | OUTPATIENT
Start: 2025-03-24

## 2025-03-24 NOTE — DIETARY NOTE
[facility]   part of [facility]   CLINICAL NUTRITION    Nutrition referral was triggered based on elevated A1c 10.6%. Noted pt is AAOx1-2 and primarily [place]ib[place]ian speaking. Pt lives with son and DIL. Will upload Healthy Meals for Diabetes in [place]ian into patient instructions. Per RN, plan to discharge today. Will continue to monitor and follow up for full nutrition assessment as appropriate per protocol.    Merlyn Randall RD, LDN, [facility]  Clinical Dietitian  Spectra: [phone]

## 2025-03-24 NOTE — DISCHARGE INSTRUCTIONS
For assistance in finding in home care please call:  Nena at [facility] [phone] Email [email or web address]  Assisting [facility] [phone]  [facility]  [phone]     [address] [address]  [phone]  [email or web address]      Alimentos saludables para la diabetes  [place] ayuda a daniel equipo de atención médica para preparar un plan de comidas adecuado para jesse necesidades. El plan establecerá el horario de jesse comidas y refrigerios, el tipo de alimentos que debe comer y la cantidad de cada wood de ellos. No tiene que dejar todas las comidas que le gusten. Claudine deberá seguir ciertas pautas.      Un proveedor de atención médica le ayudará a preparar un plan de comidas que sea adecuado a jesse necesidades.     Elija carbohidratos saludables  Los almidones, las azúcares y la fibra son todos tipos de carbohidratos. Los carbohidratos pueden tener rolf kayla reputación, especialmente porque afectan el nivel de azúcar en la toyin. Claudine ingerir la cantidad correcta de carbohidratos saludables es un gran beneficio para el cuerpo. La fibra puede ayudarle a bajar daniel colesterol y triglicéridos. También es un alimento saludable para el corazón. Debe consumir de 20 a 35 gramos de fibra total todos los días. Esta sustancia proviene de las plantas. Entre los alimentos ricos en fibra, se encuentran los siguientes:   Panes y cereales integrales  Mari secos  Arroz integral y quinoa Pastas de jacob integral  Frutas y verduras  Frijoles y chícharos (arvejas)   Lleve la cuenta de los carbohidratos que consume. [place] le ayudará a mantener el equilibrio adecuado entre actividad física y medicamentos. La cantidad de carbohidratos necesaria depende de cada persona. Depende de muchas cosas tony daniel pedrito, los medicamentos que grisel y cuán activo es. Daniel equipo de atención médica le ayudará a calcular la cantidad adecuada de carbohidratos que necesita. Puede  empezar con unos 45 a 60 gramos de carbohidratos por comida, dependiendo de daniel situación.    Estos son algunos ejemplos de alimentos que contienen unos 15 gramos de carbohidratos (1 porción de carbohidratos):   1/2 taza de fruta enlatada o congelada  Rolf porción pequeña de fruta fresca (4 onzas)  1 rebanada de pan  1/2 taza de pierre  1/3 de taza de arroz  4 a 6 galletas saladas  1/2 panecillo inglés  1/2 taza de frijoles negros  1/4 de rolf papa eugene al horno (3 onzas)  2/3 de taza de yogur regular sin grasa  1 taza de sopa  1/2 taza de guiso  6 patitas de mariusz  2 pulgadas cuadradas de brownie o de pastel sin glaseado  2 galletas dulces pequeñas  1/2 taza de helado o de sorbete  Escoja alimentos sanos con proteína  Las proteínas juegan un papel fundamental en el fortalecimiento de los músculos, los huesos, la piel y muchas otras partes del cuerpo. Las proteínas bajas en grasa pueden ayudarlo a controlar el peso. También ayudan a que el corazón se mantenga michelle. Entre los alimentos con proteína bajos en grasa se encuentran los siguientes:   Pescado  Proteínas vegetales tony lentejas, frijoles, chícharos, hair secos y productos derivados de la soya, tony el tofu y la leche de soya  Carne magra a la que se le ha quitado toda la grasa visible  Carne de ave sin piel  Leche, queso y yogur bajos en grasa o sin grasa  Limite las grasas no saludables y el azúcar  Las grasas saturadas y las grasas trans no son buenas para el corazón. Aumentan el colesterol LDL (“sandra”). Además, la grasa tiene un alto contenido de calorías y puede hacer que suba de peso. Para reducir la cantidad de grasas malas y azúcar, limite el consumo de los siguientes alimentos:   Mantequilla y margarina  Aceite de casey o de turner  Crema o artie  Queso  Tocino  Fiambres Helado  Dulces, pasteles, magdalenas y rosquillas  Mermeladas y gelatinas  Caramelos  Refrescos azucarados   Cuánto debe comer  La cantidad de comida que ingiere afecta el nivel de  azúcar en daniel toyin. También afecta daniel peso. Daniel equipo de atención médica le dirá cuánto debe comer de cada clase de alimentos.   Utilice tazas y cucharas de medir, y rolf balanza de cocina para medir las porciones.  Aprenda a identificar visualmente el tamaño correcto de rolf porción en daniel plato. [employer] le será útil cuando coma fuera de casa y no pueda medir las porciones. Por ejemplo, rolf porción de carne de res mide aproximadamente tony la casey de la mano.  Coma solamente la cantidad de porciones que le hayan dado en daniel plan de comidas para cada tipo de alimento. No se sirva por segunda vez.  Aprenda a leer las etiquetas de los alimentos. Asegúrese de leer la porción, la cantidad total de carbohidratos, fibra, calorías, azúcar, sal y grasas saturadas y trans que tiene. Busque opciones más saludables para los alimentos que tengan azúcar o sal añadida.  Planifique de antemano para las fiestas. De esta manera, aún podrá divertirse sin sobrepasarse con alimentos poco saludables. Dé un buen ejemplo al llevar un platillo saludable a las reuniones.   Elija refrigerios saludables  Cuando se habla de tentempiés, en general, wood imagina alimentos con azúcar añadida y grasas. Claudine hay otras opciones de bocadillos que son más saludables. A continuación hay algunas ideas para elegir:   Refrigerios con menos de 5 gramos de carbohidratos  1 porción de queso en tiras  3 ramas de apio con 1 cucharada de mantequilla de cacahuate  5 tomates tipo cherry con 1 cucharada de aderezo tipo ranch  1 huevo catherine  1/4 de taza de arándanos frescos   5 zanahorias bebé  1 taza de palomitas de maíz livianas  1/2 taza de gelatina sin azúcar  15 almendras  Refrigerios de 10 a 20 gramos de carbohidratos aproximadamente  1/3 de taza de hummus con 1 taza de trozos de vegetales sin almidón (zanahorias, pimientos verdes, brócoli, apio o rolf combinación)  1/2 taza de fruta fresca o enlatada con 1/4 de taza de requesón (cottage cheese)  1/2 taza de ensalada  de atún con 4 galletas saladas  2 pasteles de arroz y rolf cucharada de mantequilla de cacahuate  1 manzana o naranja pequeñas  3 tazas de palomitas de maíz livianas  1/2 sándwich de pavo que tenga 1 rebanada de pan de jacob integral, 2 onzas de pavo y mostaza  El tamaño de las porciones es importante para controlar el azúcar en la toyin y mantener un peso saludable. Aprovisiónese de bocadillos saludables para que los tenga siempre a mano.   Cuándo debe comer  Daniel plan de comidas probablemente incluirá desayuno, almuerzo, freida y algunos refrigerios entre comidas.   Intente que jesse comidas y refrigerios disha a la misma hora todos los días.  Coma todas jesse comidas y refrigerios. Saltarse rolf comida o bocadillo puede hacer que baje demasiado el nivel de azúcar en daniel toyin. También puede provocar que coma excesivamente en daniel próxima comida o bocadillo. De esta manera, el nivel de azúcar en la toyin podría elevarse demasiado.  © 9582-1175 [employer]. Todos los derechos reservados. Esta información no pretende sustituir la atención médica profesional. Sólo daniel médico puede diagnosticar y tratar un problema de pedrito.

## 2025-03-24 NOTE — DISCHARGE SUMMARY
[facility]     China Hawk Patient Status:  Inpatient    1928 MRN [id number]   Location [facility] 5NW-A Attending Romelia Fields MD   Hosp Day # 3 PCP Gerhard Rashid DO     Date of Admission: 3/21/2025  Date of Discharge:   3/24/25    Discharge Disposition: home    Discharge Diagnosis:    # Acute hypoxic respiratory failure due to possible underlying gram negative pneumonia  - on RA now     # Presumed gram negative PNA   - RVP neg  - PCT mildly elevated   - con abx   - neb   - lasix IV given x1  - OOB, IS     # Early sepsis (this is, tachycardia and tachypnea, hypoxia) , resolved  - blood cx NGTD  - RVP neg   - abx      # DM 2 with hyperglycemia /hypoglycemia this am   - HgA1c >10  - stop glipize as pt with high risk of hypoglycemia at home   - ISS and Tresiba for home     # Asthma with mild exacerbation, resolved     # mild thrombocytosis likely reactive, monitor   # mild metabolic acidosis, monitor   # CKD 4 due to underlying DM  # hypothyroidism   # dementia with some delirium        History of Present Illness:   China Hawk is a 89 year old female with asthma, diabetes, hyper lipidemia, hypothyroidism to the emergency room with cough, breath.  This has been ongoing over the last 1 week.  No fever or chills, nausea or vomiting, diarrhea reported.  Lower extremity edema or orthopnea or PND reported.  Emergency room patient was found to be 87% on room air.       Brief Synopsis:  as above.   Patient now off O2, overall much better and plan to dc home. GOC discussed with POA.     Lace+ Score: 64  59-90 High Risk  29-58 Medium Risk  0-28   Low Risk       TCM Follow-Up Recommendation:  LACE > 58: High Risk of readmission after discharge from the hospital.    Discharge Medication List:     Discharge Medications        START taking these medications        Instructions Prescription details   amoxicillin clavulanate 875-125 MG Tabs  Commonly known as: Augmentin      Take  1 tablet by mouth 2 (two) times daily for 7 days.   Stop taking on: March 31, 2025  Quantity: 14 tablet  Refills: 0     NovoLOG FlexPen 100 UNIT/ML Sopn  Generic drug: insulin aspart      Test blood glucose 3 times daily before meals Inject 2 unit if blood glucose is between 141-180 mg/dL Inject 3 units if blood glucose is between 181-220 mg/dL Inject 6 units if blood glucose is between 221-260 mg/dL Inject 8 units if blood glucose is between 261-300 mg/dL Inject 10 units if blood glucose is between 301-350 mg/dL Call your physician if blood glucose is greater than 351 mg/dL,   Quantity: 5 each  Refills: 3     OneTouch Delica Lancets 33G Misc      Test blood sugar 3 times per day.   Quantity: 100 each  Refills: 6     OneTouch Verio Strp      Test blood sugar 3 times per day.   Quantity: 100 strip  Refills: 6            CHANGE how you take these medications        Instructions Prescription details   insulin glargine 100 UNIT/ML Sopn  What changed: when to take this      Inject 10 Units into the skin nightly.   Quantity: 3 each  Refills: 3            CONTINUE taking these medications        Instructions Prescription details   albuterol (2.5 MG/3ML) 0.083% Nebu  Commonly known as: Ventolin      INHALE 1 VIAL (3ML) ONE EVERY 6 HOURS AS NEEDED FOR WHEEZING   Quantity: 75 mL  Refills: 5     albuterol 108 (90 Base) MCG/ACT Aers  Commonly known as: ProAir HFA      Inhale 1 puff into the lungs every 6 (six) hours as needed for Wheezing.   Quantity: 3 each  Refills: 3     levothyroxine 50 MCG Tabs  Commonly known as: Synthroid      Take 1 tablet (50 mcg total) by mouth daily.   Quantity: 90 tablet  Refills: 3     losartan-hydroCHLOROthiazide 50-12.5 MG Tabs  Commonly known as: Hyzaar      Take 1 tablet by mouth daily.   Refills: 0     montelukast 10 MG Tabs  Commonly known as: Singulair      Take 1 tablet (10 mg total) by mouth every evening.   Quantity: 90 tablet  Refills: 3     simvastatin 20 MG Tabs  Commonly known as:  Zocor      Take 1 tablet (20 mg total) by mouth every evening.   Quantity: 90 tablet  Refills: 3            STOP taking these medications      glipiZIDE ER 2.5 MG Tb24  Commonly known as: Glucotrol XL                  Where to Get Your Medications        These medications were sent to [id number] - [place], [place] - [phone], [phone]  [address] [facility] [address]      Phone: [phone]   amoxicillin clavulanate 875-125 MG Tabs  NovoLOG FlexPen 100 UNIT/ML Sopn  OneTouch Delica Lancets 33G Misc  OneTouch Verio Strp         [place]    Follow-up appointment:   Gerhard Rashid DO  [address] [place] [address] [phone]    Follow up in 1 week(s)  Follow up    Appointments for Next 30 Days 3/24/2025 - 2025      None            Vital signs:  Temp:  [97.8 °F (36.6 °C)-98.9 °F (37.2 °C)] 97.8 °F (36.6 °C)  Pulse:  [] 115  Resp:  [16-19] 19  BP: (103-153)/(48-74) 131/67  SpO2:  [90 %-93 %] 90 %    Physical Exam:    General: No acute distress   Lungs: clear to auscultation  Cardiovascular: S1, S2  Abdomen: Soft      -----------------------------------------------------------------------------------------------  PATIENT DISCHARGE INSTRUCTIONS: See electronic chart    Romelia Fields MD    Total time spent on discharge plannin minutes     The  Cures Act makes medical notes like these available to patients in the interest of transparency. Please be advised this is a medical document. Medical documents are intended to carry relevant information, facts as evident, and the clinical opinion of the practitioner. The medical note is intended as peer to peer communication and may appear blunt or direct. It is written in medical language and may contain abbreviations or verbiage that are unfamiliar.

## 2025-03-24 NOTE — PROGRESS NOTES
03/24/25 1100   Mobility   O2 walk? Yes   SPO2% on Room Air at Rest 96   At rest oxygen flow (liters per minute) 0   SPO2% Ambulation on Room Air 90   Ambulation oxygen flow (liters per minute) 0

## 2025-03-24 NOTE — CM/SW NOTE
03/24/25 1100   CM/SW Referral Data   Referral Source Social Work (self-referral);Nurse   Reason for Referral Discharge planning;Protocol order set   Specify order set Pneumonia   Informant Son;Other  (DIL)   Medical Hx   Does patient have an established PCP? Yes   Patient Info   Patient's Home Environment House   Patient lives with Son  (RISHI)   Discharge Needs   Anticipated D/C needs Home health care;Caregiver services;To be determined     Patient is a 89 y/o female who admitted with Acute hypoxemic respiratory failure, Community acquired pneumonia, Asthma with acute exacerbation.   SW acknowledged order for Community Acquired Pneumonia order set for HH eval.     Spoke with patient's son (Basil) and DIL over the phone to complete assessment. They shared patient was recently formally diagnosed with Dementia. She lives with them in a house in [place]. They have looked into caregivers before and discussed adding resources to the AVS. Also discussed [facility] which will also be on AVS. Offered HH, they were agreeable. Will follow up with them with HH list once available.     SW/ZOEY to remain available for support and/or discharge planning.    Addendum 2:15pm: Spoke with son again, emailed him accepting HH provider list to [email or web address]. Await choice.         ANDRZEJ Ruggiero  Discharge Planner

## 2025-03-24 NOTE — PLAN OF CARE
Alert x1-2. Primarily Venezuelan speaking- ipad  utilized to discuss POC w/ pt. Understands english at times. Room air sats WNL, c/o SOB at times- scheduled nebs given with relief met. Voids. Up SBA w/ walker. Generalized weakness noted. Denies any c/o pain. No n/v/d. No questions or concerns at this time. Call light within reach.     Problem: Diabetes/Glucose Control  Goal: Glucose maintained within prescribed range  Description: INTERVENTIONS:- Monitor Blood Glucose as ordered- Assess for signs and symptoms of hyperglycemia and hypoglycemia- Administer ordered medications to maintain glucose within target range- Assess barriers to adequate nutritional intake and initiate nutrition consult as needed- Instruct patient on self management of diabetes  Outcome: Progressing     Problem: Patient/Family Goals  Goal: Patient/Family Long Term Goal  Description: Patient's Long Term Goal: discharge with appropriate resources    Interventions:  - comply with POC  - See additional Care Plan goals for specific interventions  Outcome: Progressing  Goal: Patient/Family Short Term Goal  Description: Patient's Short Term Goal:   3/22 noc: breathe easier    Interventions:   - scheduled nebs  -supplemental O2 prn  - See additional Care Plan goals for specific interventions  Outcome: Progressing

## 2025-03-25 ENCOUNTER — PATIENT OUTREACH (OUTPATIENT)
Dept: CASE MANAGEMENT | Age: OVER 89
End: 2025-03-25

## 2025-03-25 NOTE — HOME CARE LIAISON
[facility] Spoke w/ patients son who is agreeable with [facility]. Contact information placed on AVS.

## 2025-03-25 NOTE — PROGRESS NOTES
Hospital follow up.    Last A1C Value: 10.6% Date: [3/21/2025]    Dona Armstrong MD, FACE  Endocrinology  [address]  [address]  [place], [place] [address] [phone]    Dale Estevez   Family Medicine  [address] [place] [address]   [facility] [place] [address]  [phone]    Attempt #1:  Left message on voicemail for patient to call transitions specialist back to schedule follow up appointments. Provided Transitions specialist scheduling phone number [phone].

## 2025-03-26 NOTE — PROGRESS NOTES
Hospital follow up (discharged 3/24 [facility])     Last A1C Value: 10.6% Date: [3/21/2025]     DM Request :  Dona Armstrong MD, FACE  Endocrinology  [address]  [address]  [place] [address]  [phone]  Tuesday 4/08 @1:20pm w/ Ashley QUAN (existing appt), pt son does not want to move this date any sooner        PCP Request :  Dale Estevez,   Family Medicine  [address] [place] [address]   [place] [address]  [phone]  Thursday 3/27 @10am w/ Dr. Dale Estevez      Spoke with pt's son arun, who scheduled and confirmed a PCP HFU appt.   He also did confirm an existing appt w/ Dr. Armstrong for Tuesday 4/08.  He declined assistance with moving the appt sooner, the family is ok with this date for a DM HFU appt.      No further assistance needed        Closing encounter